# Patient Record
Sex: MALE | Race: WHITE | NOT HISPANIC OR LATINO | Employment: UNEMPLOYED | URBAN - METROPOLITAN AREA
[De-identification: names, ages, dates, MRNs, and addresses within clinical notes are randomized per-mention and may not be internally consistent; named-entity substitution may affect disease eponyms.]

---

## 2017-02-27 DIAGNOSIS — E11.9 TYPE 2 DIABETES MELLITUS WITHOUT COMPLICATION: ICD-10-CM

## 2017-03-01 RX ORDER — METFORMIN HYDROCHLORIDE 1000 MG/1
TABLET ORAL
Qty: 180 TABLET | Refills: 5 | OUTPATIENT
Start: 2017-03-01

## 2017-05-19 DIAGNOSIS — E11.9 TYPE 2 DIABETES MELLITUS WITHOUT COMPLICATION: ICD-10-CM

## 2017-05-19 RX ORDER — METFORMIN HYDROCHLORIDE 1000 MG/1
TABLET ORAL
Qty: 180 TABLET | Refills: 5 | OUTPATIENT
Start: 2017-05-19

## 2017-07-03 DIAGNOSIS — E11.9 TYPE 2 DIABETES MELLITUS WITHOUT COMPLICATION: ICD-10-CM

## 2017-07-05 RX ORDER — METFORMIN HYDROCHLORIDE 1000 MG/1
1000 TABLET ORAL 2 TIMES DAILY WITH MEALS
Qty: 180 TABLET | Refills: 0 | Status: SHIPPED | OUTPATIENT
Start: 2017-07-05 | End: 2017-10-04 | Stop reason: SDUPTHER

## 2017-07-05 NOTE — TELEPHONE ENCOUNTER
Hi, please call patient -- to set up with new pcp in resident clinic.  I will send in one prescription of this medicine, but patient needs to be seen for any further prescriptions.  Thank you, Hayder Esparza

## 2017-10-04 DIAGNOSIS — E11.9 TYPE 2 DIABETES MELLITUS WITHOUT COMPLICATION: ICD-10-CM

## 2017-10-04 RX ORDER — METFORMIN HYDROCHLORIDE 1000 MG/1
TABLET ORAL
Qty: 180 TABLET | Refills: 0 | Status: SHIPPED | OUTPATIENT
Start: 2017-10-04

## 2018-05-02 ENCOUNTER — TELEPHONE (OUTPATIENT)
Dept: INTERNAL MEDICINE | Facility: CLINIC | Age: 53
End: 2018-05-02

## 2018-05-02 NOTE — TELEPHONE ENCOUNTER
----- Message from Minerva Armenta sent at 5/2/2018 11:35 AM CDT -----  Contact: pt wife Thea [phone]  Pt does not have a PCP listed and he needs to get his metformin (GLUCOPHAGE) 1000 MG tablet refilled. His wife states that he has major anxiety when it comes to visiting the doctor. Dr. Esparza refilled the prescription last time and that is why I am messaging his staff because Im unsure of whom to message. Please give his wife a call.

## 2018-05-03 NOTE — TELEPHONE ENCOUNTER
----- Message from Donavan Boyer sent at 5/2/2018  3:20 PM CDT -----  Contact: [phone]  Type: Returning a call    Who left a message? Coleen     When did the practice call? 05/02/18    Comments: please advise, Thanks

## 2018-05-04 ENCOUNTER — TELEPHONE (OUTPATIENT)
Dept: INTERNAL MEDICINE | Facility: CLINIC | Age: 53
End: 2018-05-04

## 2018-05-04 NOTE — TELEPHONE ENCOUNTER
----- Message from Vasquez Bedoya sent at 5/4/2018  1:36 PM CDT -----  Contact: Spouse/Leena [phone]  Is this a refill or new RX:  Refill    RX name and strength: metformin (GLUCOPHAGE) 1000 MG tablet    Pharmacy name and phone #[id number] [place]  [phone] (Phone) [phone] (Fax)    Comments:  She returned the call from Monica wayne.

## 2018-05-04 NOTE — TELEPHONE ENCOUNTER
Called and spoke to pt's wife. Wife was wondering if we could refill her 's medication even though her  has not been seen by Dr. Esparza and has not seen a PCP in 2 years. She stated pt has been receiving his medication from their [facility] but was unable to continue receiving medication. I advised her that the pt would need to be seen by a PCP before the medication could be refilled. Pt's wife will try to get  to call and sched an apt.

## 2022-02-25 ENCOUNTER — TELEPHONE (OUTPATIENT)
Dept: OPTOMETRY | Facility: CLINIC | Age: 57
End: 2022-02-25
Payer: MEDICAID

## 2022-02-25 NOTE — TELEPHONE ENCOUNTER
----- Message from Zhanna Rodriguez sent at 2/24/2022  6:28 PM CST -----  Contact: Patietn wife  Type:  Sooner Apoointment Request      Name of Caller:Patient wife  Would the patient rather a call back or a response via [facility]? Call back   Best Call Back Number:[phone]  Additional Information: Please assist